# Patient Record
(demographics unavailable — no encounter records)

---

## 2024-11-20 NOTE — CARDIOLOGY SUMMARY
[de-identified] : 05/23/2024: NSR, (+) RBBB.  =======================================================

## 2024-11-20 NOTE — HISTORY OF PRESENT ILLNESS
[FreeTextEntry1] : GERBER FRITZ is a 72-year-old female, with a PMHx significant for HTN, HLD, DM, PAD, breast CA, lung CA, and splenic artery aneurysm (being followed at [place]), who presents today for a follow-up visit. Patient recently had a CT of the lungs [place] [facility] [place] and was noted to have coronary calcifications. Of note, patient is not able to ambulate well due to bilateral knee pain.   Family History: (+) CAD: father, (+) HTN: father, (+) HLD: father.  Social History: (+) Former smoker x 25 years, (-) EtOH, (-) Illicit drug use.

## 2024-11-20 NOTE — CARDIOLOGY SUMMARY
[de-identified] : 05/23/2024: NSR, (+) RBBB.  =======================================================

## 2024-11-20 NOTE — DISCUSSION/SUMMARY
[FreeTextEntry1] : EKG performed today revealed NSR, (+) RBBB; unchanged from prior.   Abnormal CT: Patient has a repeat CT scheduled for January. Will reassess CT for coronary calcifications. If symptomatic, recommend a nuclear stress test.   HTN: The impression is hypertension. Currently, the condition is controlled. There are no changes in medication management. Continue current medical therapy. Other planned treatments include an exercise regimen, weight loss, low sodium diet, and alcohol moderation.  HLD: The impression is hyperlipidemia. Currently, the condition is controlled. There are no changes in medication management. Continue current medical therapy. Other planned treatment includes diet modification, exercise, and weight loss.  DM: The impression is diabetes mellitus. There are no changes in medication management. Patient advised to continue taking medications as prescribed, closely monitor blood sugar at home, follow a diabetic diet, exercise, lose weight, and follow up for continued monitoring. Discussed importance of diet and exercise to improve glycemic control.  PAD: Followed by vascular surgery at [place].  Obesity: Discussed risks of obesity with the patient. Counselled on weight loss with dietary modification and increased physical activity/exercise.  Instructed to follow up in 6 months.  Plan was discussed with the patient. [EKG obtained to assist in diagnosis and management of assessed problem(s)] : EKG obtained to assist in diagnosis and management of assessed problem(s)

## 2024-11-20 NOTE — HISTORY OF PRESENT ILLNESS
[FreeTextEntry1] : GERBER FRITZ is a 72-year-old female, with a PMHx significant for HTN, HLD, DM, PAD, breast CA, lung CA, and splenic artery aneurysm (being followed at [place]), who presents today for a follow-up visit. Patient recently had a CT of the lungs [place] [facility] [place] and was noted to have coronary calcifications. Of note, patient is not able to ambulate well due to bilateral knee pain.   Family History: (+) CAD: father, (+) HTN: father, (+) HLD: father.  Social History: (+) Former smoker x 25 years, (-) EtOH, (-) Illicit drug use.

## 2024-11-20 NOTE — DISCUSSION/SUMMARY
[FreeTextEntry1] : EKG performed today revealed NSR, (+) RBBB; unchanged from prior.   Abnormal CT: Patient has a repeat CT scheduled for January. Will reassess CT for coronary calcifications. If symptomatic, recommend a nuclear stress test.   HTN: The impression is hypertension. Currently, the condition is controlled. There are no changes in medication management. Continue current medical therapy. Other planned treatments include an exercise regimen, weight loss, low sodium diet, and alcohol moderation.  HLD: The impression is hyperlipidemia. Currently, the condition is controlled. There are no changes in medication management. Continue current medical therapy. Other planned treatment includes diet modification, exercise, and weight loss.  DM: The impression is diabetes mellitus. There are no changes in medication management. Patient advised to continue taking medications as prescribed, closely monitor blood sugar at home, follow a diabetic diet, exercise, lose weight, and follow up for continued monitoring. Discussed importance of diet and exercise to improve glycemic control.  PAD: Followed by vascular surgery at [place].  Obesity: Discussed risks of obesity with the patient. Counselled on weight loss with dietary modification and increased physical activity/exercise.  Instructed to follow up in 6 months.  Plan was discussed with the patient. [EKG obtained to assist in diagnosis and management of assessed problem(s)] : EKG obtained to assist in diagnosis and management of assessed problem(s)

## 2025-05-19 NOTE — CARDIOLOGY SUMMARY
[de-identified] : 05/23/2024: NSR, (+) RBBB.  =======================================================

## 2025-05-19 NOTE — DISCUSSION/SUMMARY
[EKG obtained to assist in diagnosis and management of assessed problem(s)] : EKG obtained to assist in diagnosis and management of assessed problem(s) [FreeTextEntry1] : EKG performed today revealed NSR, (+) RBBB; unchanged from prior.   HTN: Currently, the condition is controlled. There are no changes in medication management. Continue current medical therapy. Other planned treatments include an exercise regimen, weight loss, low sodium diet, and alcohol moderation.  HLD: Currently, the condition is stable. There are no changes in medication management. Continue current medical therapy. Other planned treatment includes diet modification, exercise, and weight loss.  DM: There are no changes in medication management. Patient advised to continue taking medications as prescribed, closely monitor blood sugar at home, follow a diabetic diet, exercise, lose weight, and follow up for continued monitoring. Discussed importance of diet and exercise to improve glycemic control.  PAD: Followed by vascular surgery at [place].  Obesity: Discussed risks of obesity with the patient. Counselled on weight loss with dietary modification and increased physical activity/exercise.  Instructed to follow up in 6 months.  Plan was discussed with the patient.

## 2025-05-19 NOTE — HISTORY OF PRESENT ILLNESS
[FreeTextEntry1] : GERBER FRITZ is a 72-year-old female, with a PMHx significant for HTN, HLD, DM, PAD, breast CA, lung CA, and splenic artery aneurysm (being followed at [place]), who presents today for a follow-up visit. Denies any new complaints. Reports she is feeling well. Otherwise: (-) chest pain, (-) SOB.  Family History: (+) CAD: father, (+) HTN: father, (+) HLD: father.  Social History: (+) Former smoker x 25 years, (-) EtOH, (-) Illicit drug use.

## 2025-05-19 NOTE — HISTORY OF PRESENT ILLNESS
[FreeTextEntry1] : GERBER FRITZ is a 72-year-old female, with a PMHx significant for HTN, HLD, DM, PAD, breast CA, lung CA, and splenic artery aneurysm (being followed at [place]), who presents today for a follow-up visit. Denies any new complaints. Reports she is feeling well. Otherwise: (-) chest pain, (-) SOB.  Family History: (+) CAD: father, (+) HTN: father, (+) HLD: father.  Social History: (+) Former smoker x 25 years, (-) EtOH, (-) Illicit drug use.

## 2025-05-19 NOTE — DISCUSSION/SUMMARY
[EKG obtained to assist in diagnosis and management of assessed problem(s)] : EKG obtained to assist in diagnosis and management of assessed problem(s) [FreeTextEntry1] : EKG performed today revealed NSR, (+) RBBB; unchanged from prior.   HTN: Currently, the condition is controlled. There are no changes in medication management. Continue current medical therapy. Other planned treatments include an exercise regimen, weight loss, low sodium diet, and alcohol moderation.  HLD: Currently, the condition is stable. There are no changes in medication management. Continue current medical therapy. Other planned treatment includes diet modification, exercise, and weight loss.  DM: There are no changes in medication management. Patient advised to continue taking medications as prescribed, closely monitor blood sugar at home, follow a diabetic diet, exercise, lose weight, and follow up for continued monitoring. Discussed importance of diet and exercise to improve glycemic control.  PAD: Followed by vascular surgery at [place].  Obesity: Discussed risks of obesity with the patient. Counselled on weight loss with dietary modification and increased physical activity/exercise.  Instructed to follow up in 6 months.  Plan was discussed with the patient.

## 2025-05-19 NOTE — CARDIOLOGY SUMMARY
[de-identified] : 05/23/2024: NSR, (+) RBBB.  =======================================================